# Patient Record
Sex: FEMALE | URBAN - METROPOLITAN AREA
[De-identification: names, ages, dates, MRNs, and addresses within clinical notes are randomized per-mention and may not be internally consistent; named-entity substitution may affect disease eponyms.]

---

## 2018-01-27 PROBLEM — Z00.00 ENCOUNTER FOR PREVENTIVE HEALTH EXAMINATION: Status: ACTIVE | Noted: 2018-01-27

## 2018-02-09 ENCOUNTER — OUTPATIENT (OUTPATIENT)
Dept: OUTPATIENT SERVICES | Facility: HOSPITAL | Age: 38
LOS: 1 days | Discharge: HOME | End: 2018-02-09

## 2018-02-09 DIAGNOSIS — Z02.9 ENCOUNTER FOR ADMINISTRATIVE EXAMINATIONS, UNSPECIFIED: ICD-10-CM

## 2018-02-12 ENCOUNTER — OUTPATIENT (OUTPATIENT)
Dept: OUTPATIENT SERVICES | Facility: HOSPITAL | Age: 38
LOS: 1 days | Discharge: HOME | End: 2018-02-12

## 2018-02-12 DIAGNOSIS — Z02.9 ENCOUNTER FOR ADMINISTRATIVE EXAMINATIONS, UNSPECIFIED: ICD-10-CM

## 2018-02-19 ENCOUNTER — RESULT REVIEW (OUTPATIENT)
Age: 38
End: 2018-02-19

## 2018-02-19 ENCOUNTER — OUTPATIENT (OUTPATIENT)
Dept: OUTPATIENT SERVICES | Facility: HOSPITAL | Age: 38
LOS: 1 days | Discharge: HOME | End: 2018-02-19

## 2018-02-19 DIAGNOSIS — N63.20 UNSPECIFIED LUMP IN THE LEFT BREAST, UNSPECIFIED QUADRANT: ICD-10-CM

## 2018-02-19 DIAGNOSIS — D24.2 BENIGN NEOPLASM OF LEFT BREAST: ICD-10-CM

## 2018-02-20 LAB
SURGICAL PATHOLOGY STUDY: SIGNIFICANT CHANGE UP

## 2018-02-26 ENCOUNTER — APPOINTMENT (OUTPATIENT)
Dept: SURGERY | Facility: CLINIC | Age: 38
End: 2018-02-26

## 2018-10-25 PROBLEM — Z00.00 ENCOUNTER FOR PREVENTIVE HEALTH EXAMINATION: Status: ACTIVE | Noted: 2018-10-25

## 2018-11-12 ENCOUNTER — APPOINTMENT (OUTPATIENT)
Dept: BREAST CENTER | Facility: CLINIC | Age: 38
End: 2018-11-12
Payer: COMMERCIAL

## 2018-11-12 VITALS
DIASTOLIC BLOOD PRESSURE: 83 MMHG | SYSTOLIC BLOOD PRESSURE: 128 MMHG | HEART RATE: 81 BPM | OXYGEN SATURATION: 100 % | BODY MASS INDEX: 23.49 KG/M2 | WEIGHT: 155 LBS | HEIGHT: 68 IN | TEMPERATURE: 98.6 F

## 2018-11-12 DIAGNOSIS — Z80.3 FAMILY HISTORY OF MALIGNANT NEOPLASM OF BREAST: ICD-10-CM

## 2018-11-12 PROCEDURE — 99243 OFF/OP CNSLTJ NEW/EST LOW 30: CPT

## 2018-11-27 ENCOUNTER — OUTPATIENT (OUTPATIENT)
Dept: OUTPATIENT SERVICES | Facility: HOSPITAL | Age: 38
LOS: 1 days | End: 2018-11-27
Payer: COMMERCIAL

## 2018-11-27 ENCOUNTER — APPOINTMENT (OUTPATIENT)
Dept: ULTRASOUND IMAGING | Facility: HOSPITAL | Age: 38
End: 2018-11-27

## 2018-11-27 DIAGNOSIS — D24.2 BENIGN NEOPLASM OF LEFT BREAST: ICD-10-CM

## 2018-11-27 PROCEDURE — 76642 ULTRASOUND BREAST LIMITED: CPT | Mod: 26,LT

## 2018-11-27 PROCEDURE — 76642 ULTRASOUND BREAST LIMITED: CPT

## 2018-12-03 ENCOUNTER — APPOINTMENT (OUTPATIENT)
Dept: BREAST CENTER | Facility: CLINIC | Age: 38
End: 2018-12-03
Payer: COMMERCIAL

## 2018-12-03 VITALS
OXYGEN SATURATION: 100 % | HEIGHT: 68 IN | SYSTOLIC BLOOD PRESSURE: 119 MMHG | WEIGHT: 155 LBS | BODY MASS INDEX: 23.49 KG/M2 | DIASTOLIC BLOOD PRESSURE: 78 MMHG | HEART RATE: 63 BPM | TEMPERATURE: 98.1 F

## 2018-12-03 DIAGNOSIS — R92.8 OTHER ABNORMAL AND INCONCLUSIVE FINDINGS ON DIAGNOSTIC IMAGING OF BREAST: ICD-10-CM

## 2018-12-03 DIAGNOSIS — Z78.9 OTHER SPECIFIED HEALTH STATUS: ICD-10-CM

## 2018-12-03 DIAGNOSIS — N63.0 UNSPECIFIED LUMP IN UNSPECIFIED BREAST: ICD-10-CM

## 2018-12-03 PROCEDURE — 99214 OFFICE O/P EST MOD 30 MIN: CPT

## 2018-12-11 ENCOUNTER — OUTPATIENT (OUTPATIENT)
Dept: OUTPATIENT SERVICES | Facility: HOSPITAL | Age: 38
LOS: 1 days | End: 2018-12-11
Payer: COMMERCIAL

## 2018-12-11 VITALS
OXYGEN SATURATION: 100 % | HEART RATE: 75 BPM | HEIGHT: 67 IN | DIASTOLIC BLOOD PRESSURE: 80 MMHG | TEMPERATURE: 99 F | RESPIRATION RATE: 16 BRPM | SYSTOLIC BLOOD PRESSURE: 115 MMHG | WEIGHT: 158.95 LBS

## 2018-12-11 DIAGNOSIS — Z01.818 ENCOUNTER FOR OTHER PREPROCEDURAL EXAMINATION: ICD-10-CM

## 2018-12-11 DIAGNOSIS — D24.2 BENIGN NEOPLASM OF LEFT BREAST: ICD-10-CM

## 2018-12-11 LAB
HCG UR QL: NEGATIVE — SIGNIFICANT CHANGE UP

## 2018-12-11 PROCEDURE — G0463: CPT

## 2018-12-11 PROCEDURE — 81025 URINE PREGNANCY TEST: CPT

## 2018-12-11 RX ORDER — SODIUM CHLORIDE 9 MG/ML
3 INJECTION INTRAMUSCULAR; INTRAVENOUS; SUBCUTANEOUS EVERY 8 HOURS
Refills: 0 | Status: DISCONTINUED | OUTPATIENT
Start: 2018-12-18 | End: 2018-12-26

## 2018-12-11 NOTE — H&P PST ADULT - REASON FOR ADMISSION
Patient found lump in left breast in 2017, which was small at that time, now it is growing faster, therefore Dr steele recommended to remove the lump Patient found lump in left breast in 2017, which was small at that time, now it is growing faster, therefore Dr steele recommended to remove the lump. Prior biopsy showed FA.

## 2018-12-11 NOTE — H&P PST ADULT - HISTORY OF PRESENT ILLNESS
39 yo female reports the above. Patient states had a benign biopsy of the mass, at another hospital in [place].  Pt denies pain at the site

## 2018-12-11 NOTE — H&P PST ADULT - RESPIRATORY AND THORAX
no loss of consciousness, no gait abnormality, no headache, no sensory deficits, and no weakness.
details…

## 2018-12-11 NOTE — H&P PST ADULT - NSANTHOSAYNRD_GEN_A_CORE
No. DHEERAJ screening performed.  STOP BANG Legend: 0-2 = LOW Risk; 3-4 = INTERMEDIATE Risk; 5-8 = HIGH Risk

## 2018-12-11 NOTE — H&P PST ADULT - MUSCULOSKELETAL
normal strength/ROM intact/no joint swelling/no joint warmth/no calf tenderness details… detailed exam

## 2018-12-12 ENCOUNTER — OTHER (OUTPATIENT)
Age: 38
End: 2018-12-12

## 2018-12-17 ENCOUNTER — TRANSCRIPTION ENCOUNTER (OUTPATIENT)
Age: 38
End: 2018-12-17

## 2018-12-18 ENCOUNTER — RESULT REVIEW (OUTPATIENT)
Age: 38
End: 2018-12-18

## 2018-12-18 ENCOUNTER — APPOINTMENT (OUTPATIENT)
Dept: BREAST CENTER | Facility: HOSPITAL | Age: 38
End: 2018-12-18
Payer: COMMERCIAL

## 2018-12-18 ENCOUNTER — OUTPATIENT (OUTPATIENT)
Dept: OUTPATIENT SERVICES | Facility: HOSPITAL | Age: 38
LOS: 1 days | End: 2018-12-18
Payer: COMMERCIAL

## 2018-12-18 VITALS
OXYGEN SATURATION: 100 % | SYSTOLIC BLOOD PRESSURE: 105 MMHG | HEART RATE: 80 BPM | RESPIRATION RATE: 13 BRPM | TEMPERATURE: 98 F | DIASTOLIC BLOOD PRESSURE: 67 MMHG

## 2018-12-18 VITALS
TEMPERATURE: 98 F | SYSTOLIC BLOOD PRESSURE: 130 MMHG | HEART RATE: 92 BPM | OXYGEN SATURATION: 100 % | HEIGHT: 67 IN | WEIGHT: 158.95 LBS | DIASTOLIC BLOOD PRESSURE: 77 MMHG | RESPIRATION RATE: 16 BRPM

## 2018-12-18 DIAGNOSIS — D24.2 BENIGN NEOPLASM OF LEFT BREAST: ICD-10-CM

## 2018-12-18 DIAGNOSIS — Z01.818 ENCOUNTER FOR OTHER PREPROCEDURAL EXAMINATION: ICD-10-CM

## 2018-12-18 PROCEDURE — 19120 REMOVAL OF BREAST LESION: CPT

## 2018-12-18 PROCEDURE — 88307 TISSUE EXAM BY PATHOLOGIST: CPT

## 2018-12-18 PROCEDURE — 19120 REMOVAL OF BREAST LESION: CPT | Mod: LT

## 2018-12-18 PROCEDURE — 88307 TISSUE EXAM BY PATHOLOGIST: CPT | Mod: 26

## 2018-12-18 RX ORDER — OXYCODONE AND ACETAMINOPHEN 5; 325 MG/1; MG/1
1 TABLET ORAL
Qty: 6 | Refills: 0
Start: 2018-12-18 | End: 2018-12-19

## 2018-12-18 RX ORDER — SODIUM CHLORIDE 9 MG/ML
1000 INJECTION, SOLUTION INTRAVENOUS
Refills: 0 | Status: DISCONTINUED | OUTPATIENT
Start: 2018-12-18 | End: 2018-12-18

## 2018-12-18 RX ORDER — ACETAMINOPHEN 500 MG
1000 TABLET ORAL ONCE
Refills: 0 | Status: COMPLETED | OUTPATIENT
Start: 2018-12-18 | End: 2018-12-18

## 2018-12-18 RX ORDER — HYDROMORPHONE HYDROCHLORIDE 2 MG/ML
0.5 INJECTION INTRAMUSCULAR; INTRAVENOUS; SUBCUTANEOUS
Refills: 0 | Status: DISCONTINUED | OUTPATIENT
Start: 2018-12-18 | End: 2018-12-18

## 2018-12-18 RX ADMIN — Medication 400 MILLIGRAM(S): at 17:18

## 2018-12-18 NOTE — ASU PATIENT PROFILE, ADULT - HEALTH/HEALTHCARE ANXIETIES, PROFILE
shani states she is anxious regarding anesthesia.  advised she will be speaking to Anesthesiologist and encouraged to present concerns.

## 2018-12-18 NOTE — ASU DISCHARGE PLAN (ADULT/PEDIATRIC). - MEDICATION SUMMARY - MEDICATIONS TO TAKE
I will START or STAY ON the medications listed below when I get home from the hospital:    oxycodone-acetaminophen 5 mg-325 mg oral tablet  -- 1 tab(s) by mouth every 8 hours x 2 days MDD:4 PRN as needed for moderate to severe pain not well controlled by over the counter ibuprofen   -- Caution federal law prohibits the transfer of this drug to any person other  than the person for whom it was prescribed.  May cause drowsiness.  Alcohol may intensify this effect.  Use care when operating dangerous machinery.  This prescription cannot be refilled.  This product contains acetaminophen.  Do not use  with any other product containing acetaminophen to prevent possible liver damage.  Using more of this medication than prescribed may cause serious breathing problems.    -- Indication: For Post-op pain

## 2018-12-18 NOTE — BRIEF OPERATIVE NOTE - PROCEDURE
<<-----Click on this checkbox to enter Procedure Mastectomy, partial  12/18/2018  left  Active  [facility]

## 2018-12-20 ENCOUNTER — OUTPATIENT (OUTPATIENT)
Dept: OUTPATIENT SERVICES | Facility: HOSPITAL | Age: 38
LOS: 1 days | End: 2018-12-20
Payer: COMMERCIAL

## 2018-12-20 ENCOUNTER — RESULT REVIEW (OUTPATIENT)
Age: 38
End: 2018-12-20

## 2018-12-20 DIAGNOSIS — N63.0 UNSPECIFIED LUMP IN UNSPECIFIED BREAST: ICD-10-CM

## 2018-12-20 PROCEDURE — 88321 CONSLTJ&REPRT SLD PREP ELSWR: CPT

## 2018-12-28 ENCOUNTER — APPOINTMENT (OUTPATIENT)
Dept: BREAST CENTER | Facility: CLINIC | Age: 38
End: 2018-12-28
Payer: COMMERCIAL

## 2018-12-28 VITALS
HEART RATE: 83 BPM | OXYGEN SATURATION: 100 % | DIASTOLIC BLOOD PRESSURE: 77 MMHG | BODY MASS INDEX: 23.49 KG/M2 | HEIGHT: 68 IN | TEMPERATURE: 98.8 F | SYSTOLIC BLOOD PRESSURE: 124 MMHG | WEIGHT: 155 LBS

## 2018-12-28 PROCEDURE — 99024 POSTOP FOLLOW-UP VISIT: CPT

## 2019-02-08 ENCOUNTER — APPOINTMENT (OUTPATIENT)
Dept: BREAST CENTER | Facility: CLINIC | Age: 39
End: 2019-02-08
Payer: COMMERCIAL

## 2019-02-08 VITALS
HEIGHT: 68 IN | HEART RATE: 82 BPM | BODY MASS INDEX: 23.49 KG/M2 | SYSTOLIC BLOOD PRESSURE: 124 MMHG | DIASTOLIC BLOOD PRESSURE: 78 MMHG | WEIGHT: 155 LBS | TEMPERATURE: 98.4 F

## 2019-02-08 DIAGNOSIS — D24.2 BENIGN NEOPLASM OF LEFT BREAST: ICD-10-CM

## 2019-02-08 PROCEDURE — 99024 POSTOP FOLLOW-UP VISIT: CPT

## 2019-02-08 NOTE — ASSESSMENT
[FreeTextEntry1] : 40 y/o female, here for post op visit. S/P Left excisional Bx 12/18. Bx 12/18. [facility]; FA (3.6cm).\par Patient doing well. Incision well healed w/o redness, drainage or signs of infection.\par 1/22/18 [facility]: L Dmmg, dense, 7:00 N4 well circumscribed smoothly marginated oval mass. BR4A. Rec US core Bx.\par 1/22/18 [facility]: L D US: FG, L 2:00 N5 (1.3X0.8X1.1cm) simple cyst; *In area of concern on mmg, oval, parallel, smoothly marginated well circumscribed solid avascular mass(2.5X1.5X1.8). BR4A; \par 1/22/18 [facility];R DUS, no dominant solid nodule or susp finding reported. BR1 \par 2/12/18 [facility]: 2nd opinion review; compared to 12/29/17 and 1/22/18, and outside US 1/22/18. Concurs with rec US core Bx of L 7:00 N4 (2.5X1.5X1.8cm) mass.\par 2/19/18 [facility]: L US core Bx, 7:00 N4 (2.5cm) FA, concordant. \par 11/27/18 [facility]; L D US, compared to 2/19/18: 7:00 N4 lobulated hypoechoic chip containing solid mass currently (3.7X2.8X1.6cm), inc in size (previously 2.6X2.5X1.5cm). BR2. Clinical f/u rec. \par PAunt with breast cancer at 51, No other cancers.\par CBE: Incision intact.

## 2019-02-08 NOTE — HISTORY OF PRESENT ILLNESS
[FreeTextEntry1] : 40 y/o female, here for post op visit. S/P Left excisional Bx 12/18. Bx 12/18. [facility]; FA (3.6cm).\par Patient doing well. Incision well healed w/o redness, drainage or signs of infection.\par 1/22/18 [facility]: L Dmmg, dense, 7:00 N4 well circumscribed smoothly marginated oval mass. BR4A. Rec US core Bx.\par 1/22/18 [facility]: L D US: FG, L 2:00 N5 (1.3X0.8X1.1cm) simple cyst; *In area of concern on mmg, oval, parallel, smoothly marginated well circumscribed solid avascular mass(2.5X1.5X1.8). BR4A; \par 1/22/18 [facility];R DUS, no dominant solid nodule or susp finding reported. BR1 \par 2/12/18 [facility]: 2nd opinion review; compared to 12/29/17 and 1/22/18, and outside US 1/22/18. Concurs with rec US core Bx of L 7:00 N4 (2.5X1.5X1.8cm) mass.\par 2/19/18 [facility]: L US core Bx, 7:00 N4 (2.5cm) FA, concordant. \par 11/27/18 [facility]; L D US, compared to 2/19/18: 7:00 N4 lobulated hypoechoic chip containing solid mass currently (3.7X2.8X1.6cm), inc in size (previously 2.6X2.5X1.5cm). BR2. Clinical f/u rec. \par PAunt with breast cancer at 51, No other cancers.\par \par \par  \par \par

## 2019-02-08 NOTE — PAST MEDICAL HISTORY
[Menarche Age ____] : age at menarche was [unfilled] [Total Preg ___] : G[unfilled] [Living ___] : Living: [unfilled] [Age At Live Birth ___] : Age at live birth: [unfilled] [FreeTextEntry6] : none [FreeTextEntry7] : none [FreeTextEntry8] : yes

## 2019-03-21 NOTE — H&P PST ADULT - PRO TOBACCO CESSATION INFO YN
yes Star Wedge Flap Text: The defect edges were debeveled with a #15 scalpel blade.  Given the location of the defect, shape of the defect and the proximity to free margins a star wedge flap was deemed most appropriate.  Using a sterile surgical marker, an appropriate rotation flap was drawn incorporating the defect and placing the expected incisions within the relaxed skin tension lines where possible. The area thus outlined was incised deep to adipose tissue with a #15 scalpel blade.  The skin margins were undermined to an appropriate distance in all directions utilizing iris scissors.

## 2022-07-24 NOTE — BRIEF OPERATIVE NOTE - OPERATION/FINDINGS
left breast mass consistent with fibroadenoma
For Medical Surgical Hx obtained at Admission, Please see Provider H&P

## 2024-04-03 NOTE — ASU PATIENT PROFILE, ADULT - PRO INTERPRETER NEED 2
Physical Therapy Daily Treatment Note  [facility] [address] [place] [address]    Patient: Erik Bhatia   : 1944  Referring practitioner: Kareem Guerrero MD  Date of Initial Visit: Type: THERAPY  Noted: 3/13/2024  Today's Date: 4/3/2024  Patient seen for 3 sessions           Subjective  Erik Bhatia reports: she lost her band for a while so she was doing her exercises without the band. She attributes her dog, Zeina Marquez, for getting her out walking.     Objective   See Exercise, Manual, and Modality Logs for complete treatment.     Assessment/Plan  Erik related she has not had any falls. She did well with advancement of exercises today, including additional exercises and resistance. Erik will undergo PN visit next scheduled visit with further determinations being made at that time for ongoing care.    Visit Diagnoses:    ICD-10-CM ICD-9-CM   1. Chronic left shoulder pain  M25.512 719.41    G89.29 338.29   2. Muscle weakness of left upper extremity  M62.81 728.87   3. Decreased ROM of left shoulder  M25.612 719.51   4. Balance problem  R26.89 781.99       Progress per Plan of Care and Progress strengthening /stabilization /functional activity       Timed:  Manual Therapy:         mins  80910;  Therapeutic Exercise:         mins  42258;     Neuromuscular Meron:  16      mins  92327;    Therapeutic Activity:     8     mins  90835;     Gait Training:           mins  57027;     Ultrasound:          mins  92907;    Electrical Stimulation:         mins  72370 ( );  Aquatics  __   mins   02157    Untimed:  Electrical Stimulation:         mins  51415 ( );  Mechanical Traction:         mins  30717;     Timed Treatment:   24   mins   Total Treatment:     24   mins    Electronically Signed:  Charlene Jenkins PTA  Physical Therapist Assistant    [place] PTA license [id number]            
English